# Patient Record
Sex: MALE | Race: WHITE | HISPANIC OR LATINO | Employment: FULL TIME | URBAN - METROPOLITAN AREA
[De-identification: names, ages, dates, MRNs, and addresses within clinical notes are randomized per-mention and may not be internally consistent; named-entity substitution may affect disease eponyms.]

---

## 2021-11-16 ENCOUNTER — OFFICE VISIT (OUTPATIENT)
Dept: ENDOCRINOLOGY | Facility: CLINIC | Age: 26
End: 2021-11-16
Attending: INTERNAL MEDICINE
Payer: COMMERCIAL

## 2021-11-16 PROCEDURE — 99204 PR OFFICE/OUTPT VISIT, NEW, LEVL IV, 45-59 MIN: ICD-10-PCS | Mod: 95,,, | Performed by: INTERNAL MEDICINE

## 2021-11-16 PROCEDURE — 99204 OFFICE O/P NEW MOD 45 MIN: CPT | Mod: 95,,, | Performed by: INTERNAL MEDICINE

## 2021-11-16 RX ORDER — FINASTERIDE 5 MG/1
TABLET, FILM COATED ORAL
Qty: 30 TABLET | Refills: 3 | Status: SHIPPED | OUTPATIENT
Start: 2021-11-16 | End: 2023-04-17 | Stop reason: SDUPTHER

## 2021-11-16 RX ORDER — TESTOSTERONE CYPIONATE 200 MG/ML
100 INJECTION, SOLUTION INTRAMUSCULAR
Qty: 10 ML | Refills: 5 | Status: SHIPPED | OUTPATIENT
Start: 2021-11-16 | End: 2022-08-12

## 2021-11-18 ENCOUNTER — PATIENT MESSAGE (OUTPATIENT)
Dept: SPEECH THERAPY | Facility: HOSPITAL | Age: 26
End: 2021-11-18
Payer: COMMERCIAL

## 2021-12-15 ENCOUNTER — TELEPHONE (OUTPATIENT)
Dept: SPEECH THERAPY | Facility: HOSPITAL | Age: 26
End: 2021-12-15
Payer: COMMERCIAL

## 2022-01-12 ENCOUNTER — PATIENT MESSAGE (OUTPATIENT)
Dept: SPEECH THERAPY | Facility: HOSPITAL | Age: 27
End: 2022-01-12
Payer: COMMERCIAL

## 2022-02-07 ENCOUNTER — TELEPHONE (OUTPATIENT)
Dept: SPEECH THERAPY | Facility: HOSPITAL | Age: 27
End: 2022-02-07
Payer: COMMERCIAL

## 2022-03-08 ENCOUNTER — TELEPHONE (OUTPATIENT)
Dept: SPEECH THERAPY | Facility: HOSPITAL | Age: 27
End: 2022-03-08
Payer: COMMERCIAL

## 2022-03-11 ENCOUNTER — CLINICAL SUPPORT (OUTPATIENT)
Dept: SPEECH THERAPY | Facility: HOSPITAL | Age: 27
End: 2022-03-11
Attending: INTERNAL MEDICINE
Payer: COMMERCIAL

## 2022-03-11 DIAGNOSIS — R49.0 DYSPHONIA: ICD-10-CM

## 2022-03-11 DIAGNOSIS — R49.9 VOICE COMPLAINT: ICD-10-CM

## 2022-03-11 DIAGNOSIS — R49.0 DYSPHONIA: Primary | ICD-10-CM

## 2022-03-11 PROCEDURE — 92524 BEHAVRAL QUALIT ANALYS VOICE: CPT | Mod: GN,95

## 2022-03-11 NOTE — PROGRESS NOTES
"Referring provider: Dr. Pearl Toribio  Reason for visit:  Behavioral and qualitative analysis of voice and resonance (CPT 40968)  The patient location is: [place], [place]  The chief complaint leading to consultation is: dysphonia  Visit type: audiovisual  Face to Face time with patient: 55  115 minutes of total time spent on the encounter, which includes face to face time and non-face to face time preparing to see the patient (eg, review of tests), Obtaining and/or reviewing separately obtained history, Documenting clinical information in the electronic or other health record, Independently interpreting results (not separately reported) and communicating results to the patient/family/caregiver, or Care coordination (not separately reported).     Subjective / History    Roscoe Drew (Kayla R)  is a 26 y.o. man referred for voice evaluation (CPT 46132) by Dr. Toribio. He presents with complaints of gender to voice mismatch in the setting of gender to transition.  Generally speaking he has no other symptoms of dysphonia but he did say his voice is best in the middle of the day and sometimes fatigues with use. Describes himself as a "soft-spoken person" and endorses the following associated symptoms: dryness, occasional sore throat in the morning.  He says he has a default higher-pitched voice which gets him misgendered over the phone and in customer service situations. He states: "I've never really liked my voice a whole lot." He describes some deepening of the voice after three or four years on testosterone; felt dissappointed that the hormones never did what he wanted them to. Before his transition, his voice was high pitched. He is a doherty and a former soprano. He still enjoys singing (early 2000's [employer] and [employer]). Social history: patient lives in [place]. Has a supportive group of friends and partner who live at the house with him. 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    Hormones: T for 7 years, " "voice was extremely high pitch  Swallowing: Some nuisance dysphagia to pills and solids  Breathing: WNL    Smoking: yes, 6 years ago packs/day   Caffeine: yes   Reflux: no  Water: plenty    No past medical history on file.  Current Outpatient Medications on File Prior to Visit   Medication Sig Dispense Refill    finasteride (PROSCAR) 5 mg tablet Take 1/4 pill a day 30 tablet 3    testosterone cypionate (DEPOTESTOTERONE CYPIONATE) 200 mg/mL injection Inject 0.5 mLs (100 mg total) into the muscle every 7 days. 3 ml syringe with 18 g needle  to draw  X 10 25 g 1 inch needle to inject x 10 10 mL 5     No current facility-administered medications on file prior to visit.       Objective    Perceptual/behavioral assessment  -CAPE-V Overall Score: 5  -Quality: macdonald/pulse mode phonation  -Volume: appropriate for age and gender  -Pitch: high F0  -Flexibility: appropriate for age and gender norms  -Habitual respiratory pattern: chest/clavicular    Acoustic Assessment  Multi-Dimensional Voice Program (MDVP) Analysis (sustained "ah")    Baseline Post-trial tx Gender-matched norms (F) Gender-matched norms (GN) Gender-matched norms (M)   Average fundamental frequency (Fo) 205.004 Hz  155  Hz Norm/SD: 243.97 / 27.46 Hz 145-165 Hz (Gulshan & Aniceto, 2013) or 145 Hz to 175 Hz (Preeti Goel & Ulises, 2015) Norm/SD: 145.2 / 23.41 Hz           Education / Stimulability Trials  Patient education provided on the anatomy and physiology of voice production as it pertains to the patient's diagnosis, associated symptoms and rationale for voice therapy in the context of gender affirmation. Discussed importance of vocal hygiene including: hydration, reducing caffeine/drying agents and reducing throat clearing, coughing, other phonotraumatic behaviors. Recommended several OTC dry mouth lozenges for reported symptoms of laryngeal irritation.  Patient was stimulable for improved voice using SOVT with cup phonation exercises. Pre and post acoustic " measurements taken via I-pad. Reassurance provided. Engaged patient in a collaborative goal setting process to discuss and select targets for gender affirming voice therapy including: establishing optimal pitch, increased oral resonance, optimizing formant frequencies, articulation training, inflection and and conversation training. Patient was trained in HEP. Written instructions provided. Patient was encouraged to select a voice they appreciate to identify additional vocal characteristics and qualities for gender expressive voice therapy.      Functional goals  Length Status Goal   Long term Initiated  Patient and clinician will facilitate changes in vocal function in order to restore functional use of voice for daily occupational, social, and emotional demands.    Long term Initiated  Patient will re-establish phonation with adequate balance of airflow and resonance with decreased muscle tension.    Long term Initiated   Patient will be satisfied with quality of voice as it pertains to gender identity.    Short term Initiated  Patient will complete SOVT exercises and/or resonant-focused exercises 3-5x daily to strengthen and balance the intrinsic laryngeal musculature and maximize glottic closure without medial hyperfunction.   Short term Initiated  Patient will be correctly gendered over the phone with 80% accuracy.   Short term Initiated  Patient will discriminate between easy and strained phonation with 80% accuracy.    Short term Initiated   Patient will identify the sensations associated with muscle relaxation in the abdominal, thoracic, neck and facial areas during efficient phonation with minimal clinician cue.        Assessment     Patient presents with gender to voice mismatch in the setting of gender transition per Dr. Toribio.  Prognosis for continued improvement is good.     Recommendations / POC    -Recommend 2-4 sessions of voice therapy over 3-4 weeks with a speech-language pathologist to optimize  glottal postures for improved vocal function, vocal efficiency, and ease of phonation  -Continue exercises as discussed in session  -Contact clinician with any further questions

## 2022-03-11 NOTE — PATIENT INSTRUCTIONS
"Dry mouth lozenges:  - Xyliments (overnight lozenges available on [facility] in toothpaste aisle w/dry mouth products)  - Estela Bautista's Gargle  Ingredients:   - Half tsp salt.  - Half tsp baking soda.  - Half tbsp. Alize syrup.  - 6 oz warm water  Mix all ingredients and silent gargle in small amounts for two minutes. Do not eat or drink for 15 minutes after gargling. Use to relive dry/scratchy and/or sore throat. Developed by Dr.Van Tolentino, Otolaryngologist.     Voice Home Exercise Program- Cup  Phonation  Practice 3-5 times a day, as a warm up, mid-day reset and evening cool down-- in or around the C below middle-C. Add additional warm up before singing.     1. Sustains: pick a comfortable note, not too high and not too low. Approximate cup to the mouth and sustain a nice long /a/. Can you feel the sound going forward, into the cup? Hold as long as you can with ease. Repeat 3x.    2. Glides: take a breath and phonate into the cup cup, glide up and down from a comfortable note in a rolling wave motion. How can you adjust the sound to feel more R1? Maybe don't let air come out your nose or yawn sigh.  Keep it nice and easy. Repeat 3x.    3. Glide down to a pitch that is lower than where you usually speak. Say a few rote phrases such as counting to ten or some autobiographical statements" "One, two, three;" "Good-morning;" "My name is Afternoon," and "How may I help you." Take the cup away and say them again using some of the postures we discussed listen to your voice. What changes do you hear in the vocal quality? Does the voice sound louder? Does it feel easier?    "

## 2022-03-25 ENCOUNTER — CLINICAL SUPPORT (OUTPATIENT)
Dept: SPEECH THERAPY | Facility: HOSPITAL | Age: 27
End: 2022-03-25
Attending: OTOLARYNGOLOGY
Payer: COMMERCIAL

## 2022-03-25 DIAGNOSIS — R49.9 VOICE COMPLAINT: ICD-10-CM

## 2022-03-25 DIAGNOSIS — R49.0 DYSPHONIA: Primary | ICD-10-CM

## 2022-03-25 PROCEDURE — 92507 TX SP LANG VOICE COMM INDIV: CPT | Mod: 95

## 2022-03-25 NOTE — PROGRESS NOTES
Referring provider: Dr. Rutledge  Reason for visit:  Behavioral and qualitative analysis of voice and resonance (CPT   The patient location is: [place], [place]  The chief complaint leading to consultation is: dysphonia  Visit type: audiovisual  Face to Face time with patient: 55  115 minutes of total time spent on the encounter, which includes face to face time and non-face to face time preparing to see the patient (eg, review of tests), Obtaining and/or reviewing separately obtained history, Documenting clinical information in the electronic or other health record, Independently interpreting results (not separately reported) and communicating results to the patient/family/caregiver, or Care coordination (not separately reported).   Session #1    Subjective / History    Roscoe Drew (Kayla R)  is a 27 y.o. man who presents with complaints of gender to voice mismatch in the setting of gender to transition. At initial evaluation, the patient was engaged in a collaborative goal setting process and improvements were made on SOVT exercises with straw phonation. Goals for voice therapy include: establishing optimal pitch at lower F0; being correctly gendered over the phone and in customer service situations; improved self-perceptions about voice. Today he reports that people have been commenting on his voice. He is very pelased with his progress in voice therapy, thus far. He says he has been practicing the voice exercises with cup phonation 2-3 time a day. Social history: patient lives in [place]. Has a supportive group of friends and partner who live at the house with him. 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    Hormones: T for 7 years, voice was previously extremely high pitched  Swallowing: Some nuisance dysphagia to pills and solids  Breathing: WNL    Smoking: yes, 6 years ago packs/day   Caffeine: yes   Reflux: no  Water: plenty    No past medical history on file.  Current Outpatient Medications on File  "Prior to Visit   Medication Sig Dispense Refill    finasteride (PROSCAR) 5 mg tablet Take 1/4 pill a day 30 tablet 3    testosterone cypionate (DEPOTESTOTERONE CYPIONATE) 200 mg/mL injection Inject 0.5 mLs (100 mg total) into the muscle every 7 days. 3 ml syringe with 18 g needle  to draw  X 10 25 g 1 inch needle to inject x 10 10 mL 5     No current facility-administered medications on file prior to visit.       Objective    Perceptual/behavioral assessment  -CAPE-V Overall Score: 5  -Quality: macdonald/pulse mode phonation  -Volume: appropriate for age and gender  -Pitch: high F0  -Flexibility: appropriate for age and gender norms  -Habitual respiratory pattern: chest/clavicular    Previous Acoustic Assessment (3/11/22)  Multi-Dimensional Voice Program (MDVP) Analysis (sustained "ah")    Baseline Post-trial tx Gender-matched norms (F) Gender-matched norms (GN) Gender-matched norms (M)   Average fundamental frequency (Fo) 205.004 Hz  155  Hz Norm/SD: 243.97 / 27.46 Hz 145-165 Hz (Gulshan & Aniceto, 2013) or 145 Hz to 175 Hz (Preeti Goel & Ulises, 2015) Norm/SD: 145.2 / 23.41 Hz        Education / Stimulability Trials  Patient education provided on the anatomy and physiology of voice production as it pertains to the patient's diagnosis, associated symptoms and rationale for voice therapy in the context of gender affirmation. Patient was retrained in SOVT exercises with cup phonation exercises. Introduced cup-and bubble phonation to address reported symptoms of laryngeal tightness and strain. Patient establishes optimal pitch on descending glides with maintenance into short phrases and modest carryover into speech. He endorses the resolution of extralaryngeal effort and illustrated the difference between the productive (mental) effort required for maintenance of improved voice over non-productive effort or strain. Patient was trained in HEP. Composed several personal phrases with which to practice HEP. Next session; " conversation training. Written instructions provided.     Functional goals  Length Status Goal   Long term Ongoing Patient and clinician will facilitate changes in vocal function in order to restore functional use of voice for daily occupational, social, and emotional demands.    Long term Ongoing  Patient will re-establish phonation with adequate balance of airflow and resonance with decreased muscle tension.    Long term Ongoing Patient will be satisfied with quality of voice as it pertains to gender identity.    Short term Ongoing Patient will complete SOVT exercises and/or resonant-focused exercises 3-5x daily to strengthen and balance the intrinsic laryngeal musculature and maximize glottic closure without medial hyperfunction.   Short term Ongoing Patient will be correctly gendered over the phone with 80% accuracy.   Short term Ongoing Patient will discriminate between easy and strained phonation with 80% accuracy.    Short term Ongoing   Patient will identify the sensations associated with muscle relaxation in the abdominal, thoracic, neck and facial areas during efficient phonation with minimal clinician cue.        Assessment     Patient presents with gender to voice mismatch in the setting of gender transition per Dr. Toribio.  Prognosis for continued improvement is good.     Recommendations / POC    -Recommend 2-4 sessions of voice therapy over 3-4 weeks with a speech-language pathologist to optimize glottal postures for improved vocal function, vocal efficiency, and ease of phonation  -Continue exercises as discussed in session  -Contact clinician with any further questions

## 2022-03-25 NOTE — PATIENT INSTRUCTIONS
Voice Home Exercise Program- Cup and Bubble Phonation  Practice 3-5 times a day, as a warm up, mid-day reset and evening cool down-- in or around C3-G3.      1. Sustains: pick a comfortable note. Approximate lips to the rim of the cup, tilt cup towards mouth and blow bubbles as you hum. Can you feel the sound going forward, into the cup? Hold as long as you can with ease. Repeat 3x.    2. Glides: glide from your comfortable starting sound to a slightly lower sound. For reference, you have been starting you glides at G# (207.65) in the gender neutral pitch range and sliding comfortably down to C#3 (138.59) well with the masc range. Start wherever, but slide down a bit-- around the C below middle. Keep it nice and easy. Repeat 3x and then sustain some of those yummy, resonant lower notes as you let the voice drop.    3. After a comfortable lower pitch say phrases into the cup with lots of sounds and lots of bubbles. Worry less about shaping the words and more about letting the voice feel open. Practice phrases with and without the cup and listen to your voice. What changes do you hear in the vocal quality? Does the voice sound louder? Does it feel easier?

## 2022-04-07 ENCOUNTER — CLINICAL SUPPORT (OUTPATIENT)
Dept: SPEECH THERAPY | Facility: HOSPITAL | Age: 27
End: 2022-04-07
Payer: COMMERCIAL

## 2022-04-07 DIAGNOSIS — R49.0 DYSPHONIA: ICD-10-CM

## 2022-04-07 DIAGNOSIS — R49.9 VOICE COMPLAINT: ICD-10-CM

## 2022-04-07 PROCEDURE — 92507 TX SP LANG VOICE COMM INDIV: CPT | Mod: GN,95

## 2022-04-07 NOTE — PROGRESS NOTES
"Referring provider: Dr. Toribio  Reason for visit:  Behavioral and qualitative analysis of voice and resonance (CPT   The patient location is: [place], [place]  The chief complaint leading to consultation is: dysphonia  Visit type: audiovisual  Face to Face time with patient: 35 minutes  50 minutes of total time spent on the encounter, which includes face to face time and non-face to face time preparing to see the patient (eg, review of tests), Obtaining and/or reviewing separately obtained history, Documenting clinical information in the electronic or other health record, Independently interpreting results (not separately reported) and communicating results to the patient/family/caregiver, or Care coordination (not separately reported).   Session #2     Subjective / History    Roscoe Drew (Kayla R)  is a 27 y.o. man who presents with complaints of gender to voice mismatch in the setting of gender to transition. At initial evaluation, the patient was engaged in a collaborative goal setting process and improvements were made on SOVT exercises with cup phonation. Previous progress in voice therapy includes establishing and maintaining optimal pitch at lower F0. Today, Roscoe reports: "The past four days I have been correctly gendered over the phone; maintaining vocal improvements has been easier; speech patters are more difficult to remember; friends at work have been commenting on my voice." He is very pelased with his progress in voice therapy, thus far. He says he has been practicing the voice exercises with cup phonation daily but missed a few days recently due to side effects of Covid-19 booster. Social history: patient lives in [place]. Has a supportive group of friends and partner who live at the house with him. Works as an artist and as a pharmacy tech.    Hormones: T for 7 years, voice was previously extremely high pitched  Swallowing: Some nuisance dysphagia to pills and solids  Breathing: WNL    Smoking: yes, 6 years " "ago packs/day   Caffeine: yes   Reflux: no  Water: plenty    No past medical history on file.  Current Outpatient Medications on File Prior to Visit   Medication Sig Dispense Refill    finasteride (PROSCAR) 5 mg tablet Take 1/4 pill a day 30 tablet 3    testosterone cypionate (DEPOTESTOTERONE CYPIONATE) 200 mg/mL injection Inject 0.5 mLs (100 mg total) into the muscle every 7 days. 3 ml syringe with 18 g needle  to draw  X 10 25 g 1 inch needle to inject x 10 10 mL 5     No current facility-administered medications on file prior to visit.     Objective    Perceptual/behavioral assessment  -CAPE-V Overall Score: 5  -Quality: macdonald/pulse mode phonation   -Volume: appropriate for age and gender  -Pitch: high F0  -Flexibility: appropriate for age and gender norms  -Habitual respiratory pattern: chest/clavicular    Previous Acoustic Assessment (3/11/22)  Multi-Dimensional Voice Program (MDVP) Analysis (sustained "ah")    Baseline Post-trial tx Gender-matched norms (F) Gender-matched norms (GN) Gender-matched norms (M)   Average fundamental frequency (Fo) 205.004 Hz  155  Hz Norm/SD: 243.97 / 27.46 Hz 145-165 Hz (Gulshan & Aniceto, 2013) or 145 Hz to 175 Hz (Manasa, Preeti & Ulises, 2015) Norm/SD: 145.2 / 23.41 Hz        Education / Stimulability Trials  Patient education provided on the anatomy and physiology of voice production as it pertains to the patient's diagnosis, associated symptoms and rationale for voice therapy in the context of gender affirmation. SOVT exercises with cup phonation offered as a warm up. Lax vowel sounds such as /a/ and schwa trained in tandem with yawn sigh to widen and lengthen vocal tract. Sustained lax vowel sounds were effective in decreasing nasal resonance and accessing R1. When instructed to"Yawn-speak," patient maintains access to lower formant frequencies at optimal pitch with increased oral resonance. Patient demonstrates the effectiveness of this strategy while reading personalized " "phrases. He states, "I like the way that sounds." Modest carryover into speech. Modifications of HEP provided with verbal instructions. Patient was instructed to select final targets for voice therapy and keep track of vocal improvements/progress for one month. Patient was trained in HEP. Next session; conversation training. Written instructions provided.     Functional goals   Length Status Goal   Long term Met Patient and clinician will facilitate changes in vocal function in order to restore functional use of voice for daily occupational, social, and emotional demands.    Long term Ongoing  Patient will re-establish phonation with adequate balance of airflow and resonance with decreased muscle tension.    Long term Ongoing Patient will be satisfied with quality of voice as it pertains to gender identity.    Short term Met Patient will complete SOVT exercises and/or resonant-focused exercises 3-5x daily to strengthen and balance the intrinsic laryngeal musculature and maximize glottic closure without medial hyperfunction.   Short term Met Patient will be correctly gendered over the phone with 80% accuracy.   Short term Ongoing Patient will discriminate between easy and strained phonation with 80% accuracy.    Short term Ongoing   Patient will identify the sensations associated with muscle relaxation in the abdominal, thoracic, neck and facial areas during efficient phonation with minimal clinician cue.        Assessment     Patient presents with gender to voice mismatch in the setting of gender transition per Dr. Toribio.  Prognosis for continued improvement is good.     Recommendations / POC    -Recommend 2-4 sessions of voice therapy over 3-4 weeks with a speech-language pathologist to optimize glottal postures for improved vocal function, vocal efficiency, and ease of phonation  -Continue exercises as discussed in session  -Contact clinician with any further questions     "

## 2022-05-09 ENCOUNTER — CLINICAL SUPPORT (OUTPATIENT)
Dept: SPEECH THERAPY | Facility: HOSPITAL | Age: 27
End: 2022-05-09
Payer: COMMERCIAL

## 2022-05-09 DIAGNOSIS — R49.9 VOICE COMPLAINT: ICD-10-CM

## 2022-05-09 DIAGNOSIS — R49.0 DYSPHONIA: ICD-10-CM

## 2022-05-09 NOTE — PROGRESS NOTES
"Pt presents for third treatment session and endorses the resolution of initial voice complaint. He states: "I am super happy with my voice. Endorses maintenance of vocal improvements throughout the whole day.  Much more frequently being correctly gendered over the phone. Due to progress therapy was deferred. Discharge today. Pt should continue the exercises previously trained as needed and contact clinician w/further questions.     "

## 2022-08-17 ENCOUNTER — TELEPHONE (OUTPATIENT)
Dept: ENDOCRINOLOGY | Facility: CLINIC | Age: 27
End: 2022-08-17

## 2022-10-18 ENCOUNTER — OFFICE VISIT (OUTPATIENT)
Dept: URGENT CARE | Facility: CLINIC | Age: 27
End: 2022-10-18
Payer: COMMERCIAL

## 2022-10-18 VITALS
SYSTOLIC BLOOD PRESSURE: 135 MMHG | OXYGEN SATURATION: 97 % | TEMPERATURE: 99 F | HEART RATE: 99 BPM | BODY MASS INDEX: 32.39 KG/M2 | DIASTOLIC BLOOD PRESSURE: 84 MMHG | RESPIRATION RATE: 17 BRPM | HEIGHT: 60 IN | WEIGHT: 165 LBS

## 2022-10-18 DIAGNOSIS — Z02.6 ENCOUNTER RELATED TO WORKER'S COMPENSATION CLAIM: ICD-10-CM

## 2022-10-18 DIAGNOSIS — S69.91XA INJURY OF RIGHT WRIST, INITIAL ENCOUNTER: Primary | ICD-10-CM

## 2022-10-18 PROCEDURE — 73110 X-RAY EXAM OF WRIST: CPT | Mod: RT,S$GLB,, | Performed by: RADIOLOGY

## 2022-10-18 PROCEDURE — 73110 XR WRIST COMPLETE 3 VIEWS RIGHT: ICD-10-PCS | Mod: RT,S$GLB,, | Performed by: RADIOLOGY

## 2022-10-18 PROCEDURE — 99203 PR OFFICE/OUTPT VISIT, NEW, LEVL III, 30-44 MIN: ICD-10-PCS | Mod: S$GLB,,, | Performed by: NURSE PRACTITIONER

## 2022-10-18 PROCEDURE — 99203 OFFICE O/P NEW LOW 30 MIN: CPT | Mod: S$GLB,,, | Performed by: NURSE PRACTITIONER

## 2022-10-18 NOTE — PATIENT INSTRUCTIONS
Please call for follow up appointment the [facility] physician next available appointment or as directed at this visit.      -For patients above 6 months of age who are not allergic to and are not on anticoagulants, you can alternate Tylenol and Motrin every 4-6 hours for pain.  For patients less than 6 months of age, allergic to or intolerant to NSAIDS, have gastritis, gastric ulcers, or history of GI bleeds, are pregnant, or are on anticoagulant therapy, you can take Tylenol every 4 hours as needed for pain.      -Rest, elevate your affected extremity above the level of the heart, and apply ice for 20 minutes at a time 3-5 times daily over the next several days.   -Wear splint/sling as directed.     If your condition worsens at any time, including but not limited to uncontrolled pain, numbness, tingling or loss of sensation, you should report immediately to your nearest Emergency Department for further evaluation.

## 2022-10-18 NOTE — LETTER
October 18, 2022      [facility]  [address], [address]  [place] [address]  Phone: [phone]  Fax: [phone]       Patient: Jennifer Drew   YOB: 1995  Date of Visit: 10/18/2022    To Whom It May Concern:    Alessandro Drew  was at [facility] on 10/18/2022. The patient may return to work/school on 10/19/2022 with restrictions:  No physical or strenuous activity that will cause pain or strain to right wrist.  Where wrist brace at all times. If you have any questions or concerns, or if I can be of further assistance, please do not hesitate to contact me.    Sincerely,    Lesly Brooke NP

## 2022-10-18 NOTE — PROGRESS NOTES
Subjective:       Patient ID: Jennifer Drew is a 27 y.o. adult.    Chief Complaint: Wrist Pain    HPI  ROS     Objective:      Physical Exam    Assessment:       1. Injury of right wrist, initial encounter          Plan:                   No follow-ups on file.

## 2022-10-19 NOTE — PROGRESS NOTES
"Subjective:       Patient ID: Jennifer Drew is a 27 y.o. adult.    Vitals:  height is 4' 11" (1.499 m) and weight is 74.8 kg (165 lb). His oral temperature is 98.5 °F (36.9 °C). His blood pressure is 135/84 and his pulse is 99. His respiration is 17 and oxygen saturation is 97%.     Chief Complaint: Wrist Pain    HPI  Subjective:       Patient ID: Jennifer Drew is a 27 y.o. adult.     Vitals:  height is 4' 11" (1.499 m) and weight is 74.8 kg (165 lb). His oral temperature is 98.5 °F (36.9 °C). His blood pressure is 135/84 and his pulse is 99. His respiration is 17 and oxygen saturation is 97%.      Chief Complaint: Wrist Pain     Wrist Pain   The pain is present in the right hand and right wrist. This is a new problem. The current episode started today. There has been a history of trauma (Rolled ankle causing pt to fall. Tried to catch fall with outstreatched hand, injuring right wrist.). The problem occurs constantly. The quality of the pain is described as aching. The pain is at a severity of 5/10. Associated symptoms include a limited range of motion. Pertinent negatives include no fever, joint locking, joint swelling, numbness or tingling. Exacerbated by: movement. He has tried cold for the symptoms. The treatment provided no relief. There is no history of diabetes, gout, osteoarthritis or rheumatoid arthritis.      Constitution: Negative for fever.   Musculoskeletal:  Positive for abnormal ROM of joint. Negative for gout.   Neurological:  Negative for numbness.     Objective:   Physical Exam      Assessment:       1. Injury of right wrist, initial encounter    2. Encounter related to worker's compensation claim          Plan:          Injury of right wrist, initial encounter  -     XR WRIST COMPLETE 3 VIEWS RIGHT; Future; Expected date: 10/18/2022  -     WRIST BRACE FOR HOME USE     Encounter related to worker's compensation claim  ROS    Objective:      Physical Exam        Assessment:       1. Injury of right " wrist, initial encounter    2. Encounter related to worker's compensation claim            Plan:         Injury of right wrist, initial encounter  -     XR WRIST COMPLETE 3 VIEWS RIGHT; Future; Expected date: 10/18/2022  -     WRIST BRACE FOR HOME USE    Encounter related to worker's compensation claim

## 2022-10-19 NOTE — PROGRESS NOTES
"Subjective:       Patient ID: Jennifer Drew is a 27 y.o. adult.    Vitals:  height is 4' 11" (1.499 m) and weight is 74.8 kg (165 lb). His oral temperature is 98.5 °F (36.9 °C). His blood pressure is 135/84 and his pulse is 99. His respiration is 17 and oxygen saturation is 97%.     Chief Complaint: Wrist Pain    HPI  ROS    Objective:      Physical Exam      Assessment:       1. Injury of right wrist, initial encounter    2. Encounter related to worker's compensation claim            Plan:         Injury of right wrist, initial encounter  -     XR WRIST COMPLETE 3 VIEWS RIGHT; Future; Expected date: 10/18/2022  -     WRIST BRACE FOR HOME USE    Encounter related to worker's compensation claim                     "

## 2022-10-19 NOTE — PROGRESS NOTES
"Subjective:       Patient ID: Jennifer Drew is a 27 y.o. adult.    Vitals:  height is 4' 11" (1.499 m) and weight is 74.8 kg (165 lb). His oral temperature is 98.5 °F (36.9 °C). His blood pressure is 135/84 and his pulse is 99. His respiration is 17 and oxygen saturation is 97%.     Chief Complaint: Wrist Pain    Wrist Pain   The pain is present in the right hand and right wrist. This is a new problem. The current episode started today. There has been a history of trauma (Rolled ankle causing pt to fall. Tried to catch fall with outstreatched hand, injuring right wrist.). The problem occurs constantly. The quality of the pain is described as aching. The pain is at a severity of 5/10. Associated symptoms include a limited range of motion. Pertinent negatives include no fever, joint locking, joint swelling, numbness or tingling. Exacerbated by: movement. He has tried cold for the symptoms. The treatment provided no relief. There is no history of diabetes, gout, osteoarthritis or rheumatoid arthritis.     Constitution: Negative for fever.   Musculoskeletal:  Positive for abnormal ROM of joint. Negative for gout.   Neurological:  Negative for numbness.     Objective:      Physical Exam      Assessment:       1. Injury of right wrist, initial encounter    2. Encounter related to worker's compensation claim            Plan:         Injury of right wrist, initial encounter  -     XR WRIST COMPLETE 3 VIEWS RIGHT; Future; Expected date: 10/18/2022  -     WRIST BRACE FOR HOME USE    Encounter related to worker's compensation claim                     "

## 2022-10-19 NOTE — PROGRESS NOTES
Subjective:       Patient ID: Jennifer Drew is a 27 y.o. adult.    Chief Complaint: Wrist Pain    HPI  ROS     Objective:      Physical Exam    Assessment:       1. Injury of right wrist, initial encounter    2. Encounter related to worker's compensation claim          Plan:                   Follow up in about 2 days (around 10/20/2022).

## 2022-10-20 ENCOUNTER — OFFICE VISIT (OUTPATIENT)
Dept: URGENT CARE | Facility: CLINIC | Age: 27
End: 2022-10-20
Payer: COMMERCIAL

## 2022-10-20 DIAGNOSIS — S63.501D SPRAIN OF RIGHT WRIST, SUBSEQUENT ENCOUNTER: ICD-10-CM

## 2022-10-20 DIAGNOSIS — Z02.6 ENCOUNTER RELATED TO WORKER'S COMPENSATION CLAIM: Primary | ICD-10-CM

## 2022-10-20 PROCEDURE — 99214 OFFICE O/P EST MOD 30 MIN: CPT | Mod: S$GLB,,, | Performed by: FAMILY MEDICINE

## 2022-10-20 PROCEDURE — 99214 PR OFFICE/OUTPT VISIT, EST, LEVL IV, 30-39 MIN: ICD-10-PCS | Mod: S$GLB,,, | Performed by: FAMILY MEDICINE

## 2022-10-20 RX ORDER — CHLORZOXAZONE 500 MG/1
500 TABLET ORAL 4 TIMES DAILY PRN
Qty: 40 TABLET | Refills: 0 | Status: SHIPPED | OUTPATIENT
Start: 2022-10-20 | End: 2022-10-30

## 2022-10-20 RX ORDER — NAPROXEN 500 MG/1
500 TABLET ORAL 2 TIMES DAILY WITH MEALS
Qty: 20 TABLET | Refills: 0 | Status: SHIPPED | OUTPATIENT
Start: 2022-10-20

## 2022-10-20 NOTE — PATIENT INSTRUCTIONS

## 2022-10-20 NOTE — LETTER
[facility]  [address], [address]  [place] [address]  Phone: [phone]  Fax: [phone]  [facility] [facility]: [phone]    Pt Name: Jennifer Drew  Injury Date: 10/17/2022   Employee ID: [id number] Date of First Treatment: 10/20/2022   Company: [employer]      Appointment Time: 10:45 AM Arrived: 11:18 AM   Provider: Guero Avery MD Time Out: 1:35 PM     Office Treatment:   1. Encounter related to worker's compensation claim    2. Sprain of right wrist, subsequent encounter      Medications Ordered This Encounter   Medications    chlorzoxazone (PARAFON FORTE) 500 mg Tab    naproxen (NAPROSYN) 500 MG tablet      Patient Instructions: Keep dressing clean/dry/covered, Attention not to aggravate affected area, Apply ice 24-48 hours then apply heat/warm soaks    Restrictions: No lifting/pushing/pulling more than 10 lbs, Limited use of right hand and arm     Return Appointment: Nov. 3, 2022 @ 9 AM.

## 2022-10-20 NOTE — PROGRESS NOTES
Subjective:       Patient ID: Jennifer Drew is a 27 y.o. adult.    Chief Complaint: Wrist Injury (DOI- 10/17/2022 RIGHT WRIST)    Patient's place of employment - [employer]  Patient's job title - Pharmacy Tech   Date of Injury - 10/17/22  Body part injured - right wrist  Current work status per last visit -  No physical or strenuous activity that will cause pain or strain to right wrist.  Where wrist brace at all times.  Improved, same, or worse -  Same    Wrist Injury     ROS   Constitution: Positive for chills. Negative for fever.   HENT:  Positive for congestion, sinus pressure and sore throat (scratchy). Negative for ear pain and ear discharge.    Neck: Negative for neck pain.   Cardiovascular:  Negative for chest pain and palpitations.   Eyes:  Negative for eye pain and eye redness.   Respiratory:  Positive for cough (dry). Negative for shortness of breath.    Gastrointestinal:  Negative for nausea, vomiting and diarrhea.   Allergic/Immunologic: Negative for sneezing.   Neurological:  Positive for headaches.     Objective:      Physical Exam    Physical Exam   Constitutional:  Non-toxic appearance. She does not appear ill. No distress. normal  HENT:   Head: Normocephalic.   Ears:   Right Ear: Tympanic membrane normal.   Left Ear: Tympanic membrane normal.   Nose: Congestion present.   Eyes: Conjunctivae are normal. Pupils are equal, round, and reactive to light.   Cardiovascular: Normal rate.   Abdominal: Normal appearance. She exhibits no distension.   Neurological: She is alert.   Skin: Skin is warm and not diaphoretic. Capillary refill takes less than 2 seconds.   Nursing note and vitals reviewed.  Musc - tender volar Rt wrist, limited ROM, splint in place  Assessment:       1. Encounter related to worker's compensation claim    2. Sprain of right wrist, subsequent encounter        Plan:         Medications Ordered This Encounter   Medications    chlorzoxazone (PARAFON FORTE) 500 mg Tab     Sig: Take 1 tablet  (500 mg total) by mouth 4 (four) times daily as needed.     Dispense:  40 tablet     Refill:  0    naproxen (NAPROSYN) 500 MG tablet     Sig: Take 1 tablet (500 mg total) by mouth 2 (two) times daily with meals.     Dispense:  20 tablet     Refill:  0     Patient Instructions: Keep dressing clean/dry/covered, Attention not to aggravate affected area, Apply ice 24-48 hours then apply heat/warm soaks   Restrictions: No lifting/pushing/pulling more than 10 lbs, Limited use of right hand and arm  Follow up in about 10 days (around 10/30/2022) for Recheck.      Please drink plenty of fluids.  Please get plenty of rest.  Please return here or go to the Emergency Department for any concerns or worsening of condition.  If you were prescribed a narcotic medication, do not drive or operate heavy equipment or machinery while taking these medications.  If you were not prescribed an anti-inflammatory medication, and if you do not have any history of stomach/intestinal ulcers, or kidney disease, or are not taking a blood thinner such as Coumadin, Plavix, Pradaxa, Eloquis, or Xaralta for example, it is OK to take over the counter Ibuprofen or Advil or Motrin or Aleve as directed.  Do not take these medications on an empty stomach.  Rest, ice, compression and elevation to the affected joint or limb as needed.    If you were given a sling, wear it for comfort until follow up as arranged.  If you were given or placed in a splint, wear it until your follow up visit or recheck.  If you  smoke, please stop smoking.       Please follow up with your primary care doctor or specialist as needed.    To Obtain Unable  None    You must understand that you have received treatment at an Urgent Care facility only, and that you may be  released before all of your medical problems are known or treated. Urgent Care facilities are not equipped to  handle life threatening emergencies. It is recommended that you seek care at an Emergency Department  for  further evaluation of worsening or concerning symptoms, or possibly life threatening conditions as  discussed.

## 2022-11-28 ENCOUNTER — PATIENT MESSAGE (OUTPATIENT)
Dept: ENDOCRINOLOGY | Facility: CLINIC | Age: 27
End: 2022-11-28
Payer: COMMERCIAL

## 2023-02-13 ENCOUNTER — TELEPHONE (OUTPATIENT)
Dept: ENDOCRINOLOGY | Facility: CLINIC | Age: 28
End: 2023-02-13
Payer: COMMERCIAL

## 2023-02-13 NOTE — TELEPHONE ENCOUNTER
Attempted to get patient scheduled for an appt due to receiving med refill request. No response. Left message.

## 2023-04-17 DIAGNOSIS — Z78.9 TRANSGENDER: ICD-10-CM

## 2023-04-17 RX ORDER — TESTOSTERONE CYPIONATE 200 MG/ML
INJECTION, SOLUTION INTRAMUSCULAR
Qty: 10 ML | Refills: 0 | Status: SHIPPED | OUTPATIENT
Start: 2023-04-17 | End: 2023-06-30 | Stop reason: SDUPTHER

## 2023-04-17 RX ORDER — FINASTERIDE 5 MG/1
TABLET, FILM COATED ORAL
Qty: 30 TABLET | Refills: 3 | Status: SHIPPED | OUTPATIENT
Start: 2023-04-17 | End: 2023-06-30 | Stop reason: SDUPTHER

## 2023-06-23 ENCOUNTER — TELEPHONE (OUTPATIENT)
Dept: ENDOCRINOLOGY | Facility: CLINIC | Age: 28
End: 2023-06-23
Payer: COMMERCIAL

## 2023-06-24 ENCOUNTER — PATIENT MESSAGE (OUTPATIENT)
Dept: ENDOCRINOLOGY | Facility: CLINIC | Age: 28
End: 2023-06-24
Payer: COMMERCIAL

## 2023-06-30 ENCOUNTER — LAB VISIT (OUTPATIENT)
Dept: LAB | Facility: HOSPITAL | Age: 28
End: 2023-06-30
Attending: INTERNAL MEDICINE
Payer: COMMERCIAL

## 2023-06-30 ENCOUNTER — OFFICE VISIT (OUTPATIENT)
Dept: ENDOCRINOLOGY | Facility: CLINIC | Age: 28
End: 2023-06-30
Attending: INTERNAL MEDICINE
Payer: COMMERCIAL

## 2023-06-30 VITALS
DIASTOLIC BLOOD PRESSURE: 76 MMHG | WEIGHT: 175.94 LBS | HEART RATE: 96 BPM | OXYGEN SATURATION: 98 % | BODY MASS INDEX: 34.54 KG/M2 | HEIGHT: 60 IN | SYSTOLIC BLOOD PRESSURE: 130 MMHG

## 2023-06-30 DIAGNOSIS — Z78.9 TRANSGENDER: ICD-10-CM

## 2023-06-30 DIAGNOSIS — Z79.899 TRANSGENDER MAN ON HORMONE THERAPY: Primary | ICD-10-CM

## 2023-06-30 DIAGNOSIS — F64.0 TRANSGENDER MAN ON HORMONE THERAPY: Primary | ICD-10-CM

## 2023-06-30 LAB
25(OH)D3+25(OH)D2 SERPL-MCNC: 7 NG/ML (ref 30–96)
ALBUMIN SERPL BCP-MCNC: 4.2 G/DL (ref 3.5–5.2)
ALP SERPL-CCNC: 92 U/L (ref 55–135)
ALT SERPL W/O P-5'-P-CCNC: 39 U/L (ref 10–44)
ANION GAP SERPL CALC-SCNC: 8 MMOL/L (ref 8–16)
AST SERPL-CCNC: 19 U/L (ref 10–40)
BILIRUB SERPL-MCNC: 0.5 MG/DL (ref 0.1–1)
BUN SERPL-MCNC: 11 MG/DL (ref 6–20)
CALCIUM SERPL-MCNC: 9.5 MG/DL (ref 8.7–10.5)
CHLORIDE SERPL-SCNC: 108 MMOL/L (ref 95–110)
CHOLEST SERPL-MCNC: 152 MG/DL (ref 120–199)
CHOLEST/HDLC SERPL: 3.2 {RATIO} (ref 2–5)
CO2 SERPL-SCNC: 26 MMOL/L (ref 23–29)
CREAT SERPL-MCNC: 0.9 MG/DL (ref 0.5–1.4)
ERYTHROCYTE [DISTWIDTH] IN BLOOD BY AUTOMATED COUNT: 12.9 % (ref 11.5–14.5)
EST. GFR  (NO RACE VARIABLE): >60 ML/MIN/1.73 M^2
ESTIMATED AVG GLUCOSE: 94 MG/DL (ref 68–131)
GLUCOSE SERPL-MCNC: 103 MG/DL (ref 70–110)
HBA1C MFR BLD: 4.9 % (ref 4–5.6)
HCT VFR BLD AUTO: 46.1 % (ref 40–54)
HDLC SERPL-MCNC: 47 MG/DL (ref 40–75)
HDLC SERPL: 30.9 % (ref 20–50)
HGB BLD-MCNC: 15.7 G/DL (ref 14–18)
LDLC SERPL CALC-MCNC: 98 MG/DL (ref 63–159)
MCH RBC QN AUTO: 29.6 PG (ref 27–31)
MCHC RBC AUTO-ENTMCNC: 34.1 G/DL (ref 32–36)
MCV RBC AUTO: 87 FL (ref 82–98)
NONHDLC SERPL-MCNC: 105 MG/DL
PLATELET # BLD AUTO: 235 K/UL (ref 150–450)
PMV BLD AUTO: 10 FL (ref 9.2–12.9)
POTASSIUM SERPL-SCNC: 4.2 MMOL/L (ref 3.5–5.1)
PROT SERPL-MCNC: 7.6 G/DL (ref 6–8.4)
RBC # BLD AUTO: 5.3 M/UL (ref 4.6–6.2)
SODIUM SERPL-SCNC: 142 MMOL/L (ref 136–145)
TRIGL SERPL-MCNC: 35 MG/DL (ref 30–150)
TSH SERPL DL<=0.005 MIU/L-ACNC: 2.51 UIU/ML (ref 0.4–4)
WBC # BLD AUTO: 7.68 K/UL (ref 3.9–12.7)

## 2023-06-30 PROCEDURE — 36415 COLL VENOUS BLD VENIPUNCTURE: CPT | Performed by: INTERNAL MEDICINE

## 2023-06-30 PROCEDURE — 3078F DIAST BP <80 MM HG: CPT | Mod: CPTII,S$GLB,, | Performed by: INTERNAL MEDICINE

## 2023-06-30 PROCEDURE — 80061 LIPID PANEL: CPT | Performed by: INTERNAL MEDICINE

## 2023-06-30 PROCEDURE — 82306 VITAMIN D 25 HYDROXY: CPT | Performed by: INTERNAL MEDICINE

## 2023-06-30 PROCEDURE — 85027 COMPLETE CBC AUTOMATED: CPT | Performed by: INTERNAL MEDICINE

## 2023-06-30 PROCEDURE — 1160F RVW MEDS BY RX/DR IN RCRD: CPT | Mod: CPTII,S$GLB,, | Performed by: INTERNAL MEDICINE

## 2023-06-30 PROCEDURE — 99214 PR OFFICE/OUTPT VISIT, EST, LEVL IV, 30-39 MIN: ICD-10-PCS | Mod: S$GLB,,, | Performed by: INTERNAL MEDICINE

## 2023-06-30 PROCEDURE — 3078F PR MOST RECENT DIASTOLIC BLOOD PRESSURE < 80 MM HG: ICD-10-PCS | Mod: CPTII,S$GLB,, | Performed by: INTERNAL MEDICINE

## 2023-06-30 PROCEDURE — 3008F PR BODY MASS INDEX (BMI) DOCUMENTED: ICD-10-PCS | Mod: CPTII,S$GLB,, | Performed by: INTERNAL MEDICINE

## 2023-06-30 PROCEDURE — 3075F SYST BP GE 130 - 139MM HG: CPT | Mod: CPTII,S$GLB,, | Performed by: INTERNAL MEDICINE

## 2023-06-30 PROCEDURE — 3044F HG A1C LEVEL LT 7.0%: CPT | Mod: CPTII,S$GLB,, | Performed by: INTERNAL MEDICINE

## 2023-06-30 PROCEDURE — 3008F BODY MASS INDEX DOCD: CPT | Mod: CPTII,S$GLB,, | Performed by: INTERNAL MEDICINE

## 2023-06-30 PROCEDURE — 83036 HEMOGLOBIN GLYCOSYLATED A1C: CPT | Performed by: INTERNAL MEDICINE

## 2023-06-30 PROCEDURE — 80053 COMPREHEN METABOLIC PANEL: CPT | Performed by: INTERNAL MEDICINE

## 2023-06-30 PROCEDURE — 3044F PR MOST RECENT HEMOGLOBIN A1C LEVEL <7.0%: ICD-10-PCS | Mod: CPTII,S$GLB,, | Performed by: INTERNAL MEDICINE

## 2023-06-30 PROCEDURE — 99999 PR PBB SHADOW E&M-EST. PATIENT-LVL IV: CPT | Mod: PBBFAC,,, | Performed by: INTERNAL MEDICINE

## 2023-06-30 PROCEDURE — 1159F MED LIST DOCD IN RCRD: CPT | Mod: CPTII,S$GLB,, | Performed by: INTERNAL MEDICINE

## 2023-06-30 PROCEDURE — 1159F PR MEDICATION LIST DOCUMENTED IN MEDICAL RECORD: ICD-10-PCS | Mod: CPTII,S$GLB,, | Performed by: INTERNAL MEDICINE

## 2023-06-30 PROCEDURE — 3075F PR MOST RECENT SYSTOLIC BLOOD PRESS GE 130-139MM HG: ICD-10-PCS | Mod: CPTII,S$GLB,, | Performed by: INTERNAL MEDICINE

## 2023-06-30 PROCEDURE — 99999 PR PBB SHADOW E&M-EST. PATIENT-LVL IV: ICD-10-PCS | Mod: PBBFAC,,, | Performed by: INTERNAL MEDICINE

## 2023-06-30 PROCEDURE — 84443 ASSAY THYROID STIM HORMONE: CPT | Performed by: INTERNAL MEDICINE

## 2023-06-30 PROCEDURE — 1160F PR REVIEW ALL MEDS BY PRESCRIBER/CLIN PHARMACIST DOCUMENTED: ICD-10-PCS | Mod: CPTII,S$GLB,, | Performed by: INTERNAL MEDICINE

## 2023-06-30 PROCEDURE — 99214 OFFICE O/P EST MOD 30 MIN: CPT | Mod: S$GLB,,, | Performed by: INTERNAL MEDICINE

## 2023-06-30 RX ORDER — TESTOSTERONE CYPIONATE 200 MG/ML
INJECTION, SOLUTION INTRAMUSCULAR
Qty: 10 ML | Refills: 5 | Status: SHIPPED | OUTPATIENT
Start: 2023-06-30 | End: 2024-03-14

## 2023-06-30 RX ORDER — FINASTERIDE 5 MG/1
2.5 TABLET, FILM COATED ORAL DAILY
Qty: 45 TABLET | Refills: 3 | Status: SHIPPED | OUTPATIENT
Start: 2023-06-30

## 2023-06-30 NOTE — PATIENT INSTRUCTIONS
There several surgeons locally that have experience with gender patients and you may want to reach out to them regarding your top surgery.  Their names are Dr. Parth Melendez, Amy Sarah, Deshaun Lim, and Dre Bradford.        Please let me know if you need for me to put in a formal referral or if you need a letter on your behalf.

## 2023-06-30 NOTE — ASSESSMENT & PLAN NOTE
Transman: gender incongruence   Reviewed therapy, side effects (both wanted and unwanted), possible adverse outcomes, expectations, compliance.       Reviewed the need for continued contraception as testosterone is not a contraceptive if having vaginal sex with non-trans men       Provided names of surgeons     Labs today   RTC in 12  months with labs   Noting  Nl hh/ for men is:     Hemoglobin 14.0-18.0 g/dL    Hematocrit 40.0-54.0 %        Healthy lifestyle stressed  Discussed indications  for a Pap.       Reviewed risks and benefits.  ? Possible increase cad  Expected increase in h/h, changes in lipids and body composition   Possible liver effects

## 2023-06-30 NOTE — PROGRESS NOTES
"Subjective:      Patient ID: Jennifer Drew is a 28 y.o. adult.    Chief Complaint:  gender     History of Present Illness  With regards to his gender incongruence care:    DL says female   Insurance recognizes as male     First identified as a man in early childhood   Gender identity - male       Therapist at the time hormones were started:  JUAN ANTONIO      Gender Surgeries - none, but interested chest surgery      Fertility concerns - not  interested    Started cross hormone therapy:  2015  [facility]       Current regimen:    testosterone 100 mg q week im  - mondays   He is doing well    There are no issues with the injection site  He is self injecting IM   Would like for his voice to be deeper     He is pleased with  increase in terminal hair  Acne is  better      LMP:  stopped 1-2 years after starting trt            Social:  Work -  Works at [employer]   Family -  "ignore it"   Relationship, orientation-- relationship   - cis male - ParaGard IUD   Housing - lives with partner        Anxiety and depression are stable     New concerns today:   None       With regards to obesity, Body mass index is 35.53 kg/m².  Denies symptoms of hyperglycemia such as polyuria, polydipsia, nocturia, unexplained weight loss or blurred vision    Using treadmill     ROS:   As above    Objective:     /76 (BP Location: Left arm, Patient Position: Sitting, BP Method: Medium (Manual))   Pulse 96   Ht 4' 11" (1.499 m)   Wt 79.8 kg (175 lb 14.8 oz)   SpO2 98%   BMI 35.53 kg/m²   BP Readings from Last 3 Encounters:   06/30/23 130/76   10/18/22 135/84     Wt Readings from Last 1 Encounters:   06/30/23 1106 79.8 kg (175 lb 14.8 oz)     Body mass index is 35.53 kg/m².      Physical Exam  Vitals reviewed.   Constitutional:       Appearance: Normal appearance.   Neck:      Comments: No goiter   Cardiovascular:      Rate and Rhythm: Normal rate.   Pulmonary:      Effort: Pulmonary effort is normal. "   Abdominal:      Palpations: Abdomen is soft.   Musculoskeletal:      Right lower leg: No edema.      Left lower leg: No edema.   Psychiatric:         Mood and Affect: Mood normal.              Lab Review: none     Assessment and Plan     Transgender man on hormone therapy  Transman: gender incongruence   Reviewed therapy, side effects (both wanted and unwanted), possible adverse outcomes, expectations, compliance.       Reviewed the need for continued contraception as testosterone is not a contraceptive if having vaginal sex with non-trans men         Labs today   RTC in 12  months with labs   Noting  Nl hh/ for men is:     Hemoglobin 14.0-18.0 g/dL    Hematocrit 40.0-54.0 %        Healthy lifestyle stressed  Discussed indications  for a Pap.       Reviewed risks and benefits.  ? Possible increase cad  Expected increase in h/h, changes in lipids and body composition   Possible liver effects       BMI 35.0-35.9,adult    alerted as to the increased risk for t2DM  Stressed diet and exercise  Goal 5-7% weight loss    Periodic hba1c levels

## 2024-02-29 ENCOUNTER — PATIENT MESSAGE (OUTPATIENT)
Dept: ENDOCRINOLOGY | Facility: CLINIC | Age: 29
End: 2024-02-29
Payer: COMMERCIAL

## 2024-03-13 DIAGNOSIS — Z78.9 TRANSGENDER: ICD-10-CM

## 2024-03-14 RX ORDER — TESTOSTERONE CYPIONATE 200 MG/ML
INJECTION, SOLUTION INTRAMUSCULAR
Qty: 10 ML | Refills: 2 | Status: SHIPPED | OUTPATIENT
Start: 2024-03-14

## 2024-06-26 DIAGNOSIS — Z78.9 TRANSGENDER: ICD-10-CM

## 2024-06-27 RX ORDER — FINASTERIDE 5 MG/1
TABLET, FILM COATED ORAL
Qty: 45 TABLET | Refills: 0 | Status: SHIPPED | OUTPATIENT
Start: 2024-06-27

## 2024-07-10 ENCOUNTER — OFFICE VISIT (OUTPATIENT)
Dept: ENDOCRINOLOGY | Facility: CLINIC | Age: 29
End: 2024-07-10
Payer: COMMERCIAL

## 2024-07-10 ENCOUNTER — LAB VISIT (OUTPATIENT)
Dept: LAB | Facility: HOSPITAL | Age: 29
End: 2024-07-10
Attending: INTERNAL MEDICINE
Payer: COMMERCIAL

## 2024-07-10 VITALS
SYSTOLIC BLOOD PRESSURE: 134 MMHG | WEIGHT: 179.69 LBS | BODY MASS INDEX: 35.28 KG/M2 | OXYGEN SATURATION: 98 % | HEART RATE: 90 BPM | DIASTOLIC BLOOD PRESSURE: 84 MMHG | HEIGHT: 60 IN

## 2024-07-10 DIAGNOSIS — Z79.899 TRANSGENDER MAN ON HORMONE THERAPY: Primary | ICD-10-CM

## 2024-07-10 DIAGNOSIS — F64.0 TRANSGENDER MAN ON HORMONE THERAPY: Primary | ICD-10-CM

## 2024-07-10 DIAGNOSIS — E55.9 VITAMIN D DEFICIENCY: ICD-10-CM

## 2024-07-10 DIAGNOSIS — Z78.9 TRANSGENDER: ICD-10-CM

## 2024-07-10 LAB
25(OH)D3+25(OH)D2 SERPL-MCNC: 33 NG/ML (ref 30–96)
ALBUMIN SERPL BCP-MCNC: 3.9 G/DL (ref 3.5–5.2)
ALP SERPL-CCNC: 95 U/L (ref 55–135)
ALT SERPL W/O P-5'-P-CCNC: 33 U/L (ref 10–44)
ANION GAP SERPL CALC-SCNC: 10 MMOL/L (ref 8–16)
AST SERPL-CCNC: 16 U/L (ref 10–40)
BILIRUB SERPL-MCNC: 0.6 MG/DL (ref 0.1–1)
BUN SERPL-MCNC: 10 MG/DL (ref 6–20)
CALCIUM SERPL-MCNC: 9.7 MG/DL (ref 8.7–10.5)
CHLORIDE SERPL-SCNC: 106 MMOL/L (ref 95–110)
CHOLEST SERPL-MCNC: 144 MG/DL (ref 120–199)
CHOLEST/HDLC SERPL: 2.9 {RATIO} (ref 2–5)
CO2 SERPL-SCNC: 23 MMOL/L (ref 23–29)
CREAT SERPL-MCNC: 0.8 MG/DL (ref 0.5–1.4)
ERYTHROCYTE [DISTWIDTH] IN BLOOD BY AUTOMATED COUNT: 12.9 % (ref 11.5–14.5)
EST. GFR  (NO RACE VARIABLE): >60 ML/MIN/1.73 M^2
ESTIMATED AVG GLUCOSE: 94 MG/DL (ref 68–131)
GLUCOSE SERPL-MCNC: 94 MG/DL (ref 70–110)
HBA1C MFR BLD: 4.9 % (ref 4–5.6)
HCT VFR BLD AUTO: 45.7 % (ref 40–54)
HDLC SERPL-MCNC: 50 MG/DL (ref 40–75)
HDLC SERPL: 34.7 % (ref 20–50)
HGB BLD-MCNC: 14.7 G/DL (ref 14–18)
LDLC SERPL CALC-MCNC: 83 MG/DL (ref 63–159)
MCH RBC QN AUTO: 29.1 PG (ref 27–31)
MCHC RBC AUTO-ENTMCNC: 32.2 G/DL (ref 32–36)
MCV RBC AUTO: 91 FL (ref 82–98)
NONHDLC SERPL-MCNC: 94 MG/DL
PLATELET # BLD AUTO: 220 K/UL (ref 150–450)
PMV BLD AUTO: 10.1 FL (ref 9.2–12.9)
POTASSIUM SERPL-SCNC: 4.1 MMOL/L (ref 3.5–5.1)
PROT SERPL-MCNC: 7.2 G/DL (ref 6–8.4)
RBC # BLD AUTO: 5.05 M/UL (ref 4.6–6.2)
SODIUM SERPL-SCNC: 139 MMOL/L (ref 136–145)
TESTOST SERPL-MCNC: 570 NG/DL (ref 304–1227)
TRIGL SERPL-MCNC: 55 MG/DL (ref 30–150)
TSH SERPL DL<=0.005 MIU/L-ACNC: 2.56 UIU/ML (ref 0.4–4)
WBC # BLD AUTO: 6.68 K/UL (ref 3.9–12.7)

## 2024-07-10 PROCEDURE — 82306 VITAMIN D 25 HYDROXY: CPT | Performed by: INTERNAL MEDICINE

## 2024-07-10 PROCEDURE — 80061 LIPID PANEL: CPT | Performed by: INTERNAL MEDICINE

## 2024-07-10 PROCEDURE — 84443 ASSAY THYROID STIM HORMONE: CPT | Performed by: INTERNAL MEDICINE

## 2024-07-10 PROCEDURE — 1159F MED LIST DOCD IN RCRD: CPT | Mod: CPTII,S$GLB,, | Performed by: INTERNAL MEDICINE

## 2024-07-10 PROCEDURE — 84403 ASSAY OF TOTAL TESTOSTERONE: CPT | Performed by: INTERNAL MEDICINE

## 2024-07-10 PROCEDURE — 3079F DIAST BP 80-89 MM HG: CPT | Mod: CPTII,S$GLB,, | Performed by: INTERNAL MEDICINE

## 2024-07-10 PROCEDURE — 1160F RVW MEDS BY RX/DR IN RCRD: CPT | Mod: CPTII,S$GLB,, | Performed by: INTERNAL MEDICINE

## 2024-07-10 PROCEDURE — G2211 COMPLEX E/M VISIT ADD ON: HCPCS | Mod: S$GLB,,, | Performed by: INTERNAL MEDICINE

## 2024-07-10 PROCEDURE — 99214 OFFICE O/P EST MOD 30 MIN: CPT | Mod: S$GLB,,, | Performed by: INTERNAL MEDICINE

## 2024-07-10 PROCEDURE — 3008F BODY MASS INDEX DOCD: CPT | Mod: CPTII,S$GLB,, | Performed by: INTERNAL MEDICINE

## 2024-07-10 PROCEDURE — 85027 COMPLETE CBC AUTOMATED: CPT | Performed by: INTERNAL MEDICINE

## 2024-07-10 PROCEDURE — 80053 COMPREHEN METABOLIC PANEL: CPT | Performed by: INTERNAL MEDICINE

## 2024-07-10 PROCEDURE — 99999 PR PBB SHADOW E&M-EST. PATIENT-LVL III: CPT | Mod: PBBFAC,,, | Performed by: INTERNAL MEDICINE

## 2024-07-10 PROCEDURE — 36415 COLL VENOUS BLD VENIPUNCTURE: CPT | Performed by: INTERNAL MEDICINE

## 2024-07-10 PROCEDURE — 3075F SYST BP GE 130 - 139MM HG: CPT | Mod: CPTII,S$GLB,, | Performed by: INTERNAL MEDICINE

## 2024-07-10 PROCEDURE — 83036 HEMOGLOBIN GLYCOSYLATED A1C: CPT | Performed by: INTERNAL MEDICINE

## 2024-07-10 RX ORDER — RESVERA/CHROM/GR.TEA/EGCG/DIG3 50MG-20MCG
CAPSULE ORAL
COMMUNITY
Start: 2024-05-22

## 2024-07-10 RX ORDER — FINASTERIDE 5 MG/1
2.5 TABLET, FILM COATED ORAL DAILY
Qty: 45 TABLET | Refills: 3 | Status: SHIPPED | OUTPATIENT
Start: 2024-07-10

## 2024-07-10 RX ORDER — TESTOSTERONE CYPIONATE 200 MG/ML
100 INJECTION, SOLUTION INTRAMUSCULAR
Qty: 10 ML | Refills: 5 | Status: SHIPPED | OUTPATIENT
Start: 2024-07-10

## 2024-07-10 NOTE — PROGRESS NOTES
"Subjective:      Patient ID: Jennifer Drew is a 29 y.o. adult.    Chief Complaint:  gender -   Last in clinic visit 07/10/2024    History of Present Illness  With regards to his gender incongruence care:    DL says female   Insurance recognizes as male   Has not changed name legally yet       First identified as a man in early childhood   Gender identity - male       Therapist at the time hormones were started:        Gender Surgeries - top surgery 5/29/24  saw Dr. Bradford      Fertility concerns - not  interested    Started cross hormone therapy:  2015  [facility]       Current regimen:   Finasteride 2.5 qd    testosterone 100 mg q week im  - mondays   He is doing well    There are no issues with the injection site  He is self injecting IM   Would like for his voice to be deeper     He is pleased with  increase in terminal hair  Acne is  better      LMP:  stopped 1-2 years after starting trt            Social:  Work -  Works at [employer]   Family -  "ignore it"   Relationship, orientation-- relationship   - cis male - ParaGard IUD   Housing - lives with partner        Anxiety and depression are stable     New concerns today:   None       With regards to obesity, Body mass index is 36.29 kg/m².  Denies symptoms of hyperglycemia such as polyuria, polydipsia, nocturia, unexplained weight loss or blurred vision    Will restart exercising -  once cleared from a surgery standpoint    With regards to the vitamin d deficiency:  currently taking otc 2000 iu a day         ROS:   As above    Objective:     /84 (BP Location: Right arm, Patient Position: Sitting, BP Method: Medium (Manual))   Pulse 90   Ht 4' 11" (1.499 m)   Wt 81.5 kg (179 lb 10.8 oz)   SpO2 98%   BMI 36.29 kg/m²   BP Readings from Last 3 Encounters:   07/10/24 134/84   06/30/23 130/76   10/18/22 135/84     Wt Readings from Last 1 Encounters:   07/10/24 0915 81.5 kg (179 lb 10.8 oz)     Body mass index is " 36.29 kg/m².      Physical Exam  Vitals reviewed.   Constitutional:       Appearance: Normal appearance.   Neck:      Comments: No goiter   Cardiovascular:      Rate and Rhythm: Normal rate.   Pulmonary:      Effort: Pulmonary effort is normal.   Abdominal:      Palpations: Abdomen is soft.   Musculoskeletal:      Right lower leg: No edema.      Left lower leg: No edema.   Psychiatric:         Mood and Affect: Mood normal.                Lab Review: none     Assessment and Plan     Vitamin D deficiency   Over-the-counter vitamin-D 5000 units a day    BMI 35.0-35.9,adult    alerted as to the increased risk for t2DM  Stressed diet and exercise  Goal 5-7% weight loss    Periodic hba1c levels      Transgender man on hormone therapy  Transman: gender incongruence   Reviewed therapy, side effects (both wanted and unwanted), possible adverse outcomes, expectations, compliance.       Reviewed the need for continued contraception as testosterone is not a contraceptive if having vaginal sex with non-trans men       Labs today   RTC in 12  months with labs   Noting  Nl hh/ for men is:     Hemoglobin 14.0-18.0 g/dL    Hematocrit 40.0-54.0 %        Healthy lifestyle stressed  Reviewed risks and benefits.  ? Possible increase cad  Expected increase in h/h, changes in lipids and body composition   Possible liver effects

## 2024-07-10 NOTE — ASSESSMENT & PLAN NOTE
alerted as to the increased risk for t2DM  Stressed diet and exercise  Goal 5-7% weight loss    Periodic hba1c levels    
 Over-the-counter vitamin-D 5000 units a day  
Transman: gender incongruence   Reviewed therapy, side effects (both wanted and unwanted), possible adverse outcomes, expectations, compliance.       Reviewed the need for continued contraception as testosterone is not a contraceptive if having vaginal sex with non-trans men       Labs today   RTC in 12  months with labs   Noting  Nl hh/ for men is:     Hemoglobin 14.0-18.0 g/dL    Hematocrit 40.0-54.0 %        Healthy lifestyle stressed  Reviewed risks and benefits.  ? Possible increase cad  Expected increase in h/h, changes in lipids and body composition   Possible liver effects     
Praveena

## 2025-02-01 DIAGNOSIS — Z78.9 TRANSGENDER: ICD-10-CM

## 2025-02-03 RX ORDER — TESTOSTERONE CYPIONATE 200 MG/ML
INJECTION, SOLUTION INTRAMUSCULAR
Qty: 10 ML | Refills: 4 | Status: SHIPPED | OUTPATIENT
Start: 2025-02-03

## 2025-07-16 DIAGNOSIS — Z78.9 TRANSGENDER: ICD-10-CM

## 2025-07-21 RX ORDER — FINASTERIDE 5 MG/1
TABLET, FILM COATED ORAL
Qty: 45 TABLET | Refills: 0 | Status: SHIPPED | OUTPATIENT
Start: 2025-07-21